# Patient Record
Sex: FEMALE | Race: WHITE
[De-identification: names, ages, dates, MRNs, and addresses within clinical notes are randomized per-mention and may not be internally consistent; named-entity substitution may affect disease eponyms.]

---

## 2024-04-08 PROBLEM — Z00.00 ENCOUNTER FOR PREVENTIVE HEALTH EXAMINATION: Status: ACTIVE | Noted: 2024-04-08

## 2024-06-04 ENCOUNTER — APPOINTMENT (OUTPATIENT)
Dept: CARDIOLOGY | Facility: CLINIC | Age: 54
End: 2024-06-04
Payer: COMMERCIAL

## 2024-06-04 VITALS
OXYGEN SATURATION: 96 % | SYSTOLIC BLOOD PRESSURE: 112 MMHG | DIASTOLIC BLOOD PRESSURE: 82 MMHG | BODY MASS INDEX: 24.01 KG/M2 | HEART RATE: 75 BPM | HEIGHT: 67 IN | WEIGHT: 153 LBS

## 2024-06-04 DIAGNOSIS — F17.200 NICOTINE DEPENDENCE, UNSPECIFIED, UNCOMPLICATED: ICD-10-CM

## 2024-06-04 DIAGNOSIS — Z85.43 PERSONAL HISTORY OF MALIGNANT NEOPLASM OF OVARY: ICD-10-CM

## 2024-06-04 DIAGNOSIS — R25.2 CRAMP AND SPASM: ICD-10-CM

## 2024-06-04 DIAGNOSIS — Z80.3 FAMILY HISTORY OF MALIGNANT NEOPLASM OF BREAST: ICD-10-CM

## 2024-06-04 DIAGNOSIS — Z80.7 FAMILY HISTORY OF OTHER MALIGNANT NEOPLASMS OF LYMPHOID, HEMATOPOIETIC AND RELATED TISSUES: ICD-10-CM

## 2024-06-04 DIAGNOSIS — Z82.3 FAMILY HISTORY OF STROKE: ICD-10-CM

## 2024-06-04 PROCEDURE — 99204 OFFICE O/P NEW MOD 45 MIN: CPT

## 2024-06-04 RX ORDER — RALOXIFENE HYDROCHLORIDE 60 MG/1
TABLET, FILM COATED ORAL DAILY
Refills: 0 | Status: ACTIVE | COMMUNITY

## 2024-06-04 NOTE — REASON FOR VISIT
[FreeTextEntry1] : The patient is referred for cardiology evaluation because of an abnormal ECG.  The patient is a current cigarette smoker with some complaints of chest pain.  Patient underwent ECG testing while in the emergency room for leg cramps.  Patient had an ECG which available to me.  The ECG was interpreted by another physician.  Today independently reviewed and interpreted the ECG.  The ECG reveals normal sinus rhythm, there are T wave inversions in V1 through V3.  There is ST depression in V3.  There is an incomplete right bundle branch block.  The patient has fairly good exercise capacity without exertional symptoms.  The patient states that approximately few years ago she developed substernal chest discomfort syndrome.  She cannot describe it.  Is not consistently related to exertion.  It is not associated with any other symptoms.  It lasts under 15 minutes and then resolves.  There is been no change recently.

## 2024-06-04 NOTE — ASSESSMENT
[FreeTextEntry1] : Chest discomfort: Patient has an abnormal ECG with ST depression in V3.  Exercise ECG testing will not be helpful.  Exercise echocardiography has been arranged to rule out ischemia.  Transthoracic echocardiography has been arranged to rule out myocardial disease.  The patient has been advised to call 911 for any chest discomfort lasting over 15 minutes.

## 2024-06-26 ENCOUNTER — APPOINTMENT (OUTPATIENT)
Dept: CARDIOLOGY | Facility: CLINIC | Age: 54
End: 2024-06-26
Payer: COMMERCIAL

## 2024-06-26 PROCEDURE — 93306 TTE W/DOPPLER COMPLETE: CPT

## 2024-07-02 ENCOUNTER — APPOINTMENT (OUTPATIENT)
Dept: CARDIOLOGY | Facility: CLINIC | Age: 54
End: 2024-07-02
Payer: COMMERCIAL

## 2024-07-02 DIAGNOSIS — R07.9 CHEST PAIN, UNSPECIFIED: ICD-10-CM

## 2024-07-02 PROCEDURE — 99214 OFFICE O/P EST MOD 30 MIN: CPT

## 2024-07-02 PROCEDURE — 99214 OFFICE O/P EST MOD 30 MIN: CPT | Mod: 25

## 2024-07-02 PROCEDURE — 93015 CV STRESS TEST SUPVJ I&R: CPT

## 2024-08-13 ENCOUNTER — APPOINTMENT (OUTPATIENT)
Dept: CARDIOLOGY | Facility: CLINIC | Age: 54
End: 2024-08-13
Payer: COMMERCIAL

## 2024-08-13 PROCEDURE — 93351 STRESS TTE COMPLETE: CPT

## 2024-08-13 PROCEDURE — 93320 DOPPLER ECHO COMPLETE: CPT
